# Patient Record
Sex: FEMALE | Race: WHITE | NOT HISPANIC OR LATINO | Employment: FULL TIME | URBAN - METROPOLITAN AREA
[De-identification: names, ages, dates, MRNs, and addresses within clinical notes are randomized per-mention and may not be internally consistent; named-entity substitution may affect disease eponyms.]

---

## 2018-05-20 ENCOUNTER — OFFICE VISIT (OUTPATIENT)
Dept: URGENT CARE | Facility: CLINIC | Age: 63
End: 2018-05-20
Payer: COMMERCIAL

## 2018-05-20 VITALS
WEIGHT: 240 LBS | HEART RATE: 83 BPM | BODY MASS INDEX: 37.67 KG/M2 | HEIGHT: 67 IN | TEMPERATURE: 97 F | OXYGEN SATURATION: 98 % | SYSTOLIC BLOOD PRESSURE: 141 MMHG | DIASTOLIC BLOOD PRESSURE: 85 MMHG | RESPIRATION RATE: 16 BRPM

## 2018-05-20 DIAGNOSIS — S52.122A CLOSED DISPLACED FRACTURE OF HEAD OF LEFT RADIUS, INITIAL ENCOUNTER: Primary | ICD-10-CM

## 2018-05-20 DIAGNOSIS — S80.211A ABRASION OF RIGHT KNEE, INITIAL ENCOUNTER: ICD-10-CM

## 2018-05-20 DIAGNOSIS — S49.92XA INJURY OF LEFT UPPER ARM, INITIAL ENCOUNTER: ICD-10-CM

## 2018-05-20 PROCEDURE — 99204 OFFICE O/P NEW MOD 45 MIN: CPT | Mod: S$GLB,,, | Performed by: NURSE PRACTITIONER

## 2018-05-20 RX ORDER — LOVASTATIN 40 MG/1
40 TABLET ORAL NIGHTLY
COMMUNITY

## 2018-05-20 RX ORDER — MUPIROCIN 20 MG/G
OINTMENT TOPICAL
Qty: 22 G | Refills: 1 | Status: SHIPPED | OUTPATIENT
Start: 2018-05-20

## 2018-05-20 RX ORDER — ACETAMINOPHEN 500 MG
1 TABLET ORAL DAILY
COMMUNITY

## 2018-05-20 RX ORDER — OMEPRAZOLE 20 MG/1
20 CAPSULE, DELAYED RELEASE ORAL DAILY
COMMUNITY

## 2018-05-20 RX ORDER — MELOXICAM 15 MG/1
15 TABLET ORAL DAILY
COMMUNITY

## 2018-05-20 RX ORDER — ACETAMINOPHEN AND CODEINE PHOSPHATE 300; 15 MG/1; MG/1
1 TABLET ORAL
Qty: 15 TABLET | Refills: 0 | Status: SHIPPED | OUTPATIENT
Start: 2018-05-20 | End: 2018-05-21

## 2018-05-20 RX ORDER — AMLODIPINE BESYLATE 5 MG/1
5 TABLET ORAL DAILY
COMMUNITY

## 2018-05-20 RX ORDER — TRIAMTERENE AND HYDROCHLOROTHIAZIDE 37.5; 25 MG/1; MG/1
1 CAPSULE ORAL EVERY MORNING
COMMUNITY

## 2018-05-20 RX ORDER — MUPIROCIN 20 MG/G
OINTMENT TOPICAL
Status: COMPLETED | OUTPATIENT
Start: 2018-05-20 | End: 2018-05-20

## 2018-05-20 RX ORDER — POTASSIUM CHLORIDE 1.5 G/1.58G
20 POWDER, FOR SOLUTION ORAL DAILY
COMMUNITY

## 2018-05-20 RX ADMIN — MUPIROCIN: 20 OINTMENT TOPICAL at 12:05

## 2018-05-20 NOTE — ADDENDUM NOTE
Addended by: THEODORE RICHARDSON on: 5/20/2018 12:56 PM     Modules accepted: Orders, Level of Service

## 2018-05-20 NOTE — PROGRESS NOTES
"Subjective:       Patient ID: Iza Powell is a 62 y.o. female.    Vitals:  height is 5' 7" (1.702 m) and weight is 108.9 kg (240 lb). Her temperature is 97.4 °F (36.3 °C). Her blood pressure is 141/85 (abnormal) and her pulse is 83. Her respiration is 16 and oxygen saturation is 98%.     Chief Complaint: Arm Injury (happened 2 minutes ago)    Pt presents with injury to her left arm from a fall that happened about 20 minutes ago. Pt states she scrapped her left knee as well. Pt denies any loss of consciousness or dizziness. Pt states she can move her arm and she cant hold much.      Arm Injury    The incident occurred less than 1 hour ago. The injury mechanism was a fall. The pain is present in the left forearm. Pertinent negatives include no chest pain or numbness.     Review of Systems   Constitution: Negative for weakness and malaise/fatigue.   HENT: Negative for nosebleeds.    Cardiovascular: Negative for chest pain and syncope.   Respiratory: Negative for shortness of breath.    Musculoskeletal: Positive for falls and joint pain. Negative for back pain and neck pain.   Gastrointestinal: Negative for abdominal pain.   Genitourinary: Negative for hematuria.   Neurological: Negative for dizziness and numbness.       Objective:      Physical Exam   Musculoskeletal:        Left elbow: She exhibits decreased range of motion. She exhibits no swelling, no effusion, no deformity and no laceration. Tenderness found. Lateral epicondyle tenderness noted. No radial head, no medial epicondyle and no olecranon process tenderness noted.        Right knee: Tenderness found. No medial joint line, no lateral joint line, no MCL, no LCL and no patellar tendon tenderness noted.        Arms:       Legs:  Right knee abrasion. Patient able to bear weight without pain or a limp.                   Assessment:       1. Injury of left upper arm, initial encounter    2. Abrasion of right knee, initial encounter    3. Closed displaced " fracture of head of left radius, initial encounter        Plan:         Injury of left upper arm, initial encounter  -     X-Ray Elbow Complete Left; Future; Expected date: 05/20/2018  -     X-Ray Forearm Left; Future; Expected date: 05/20/2018    Abrasion of right knee, initial encounter    Closed displaced fracture of head of left radius, initial encounter  -     IMMOBILIZER FOR HOME USE       Patient Instructions                                                                Ortho   If your condition worsens or fails to improve we recommend that you receive another evaluation at the ER immediately or contact your PCP to discuss your concerns or return here. You must understand that you've received an urgent care treatment only and that you may be released before all your medical problems are known or treated. You the [facility]   If you were prescribed a narcotic or muscle relaxant do not drive or operate heavy machinery while taking these medications   Tylenol or ibuprofen can also be used as directed for pain unless you have an allergy to them or medical condition such as stomach ulcers, kidney or liver disease or blood thinners etc for which you should not be taking these type of medications.   If you were given a prescription NSAID here do not also take any over the counter NSAID like ibuprofen, aleve, advil, motrin etc   RICE which means rest, ice compression and elevation are helpful.   If you have Low Back Pain and develop bowel or bladder symptoms or increase pain going down your legs go to the ER immediately.   If you were given a splint wear it at all times.   If you were given crutches use them as we instructed. Do not rest your armpits on the foam pad or you risk compressing the nerves and the vessels there     Skin Infection  If your condition worsens or fails to improve we recommend that you receive another evaluation at the ER immediately or contact your  PCP to discuss your concerns or return here. You must understand that you've received an urgent care treatment only and that you may be released before all your medical problems are known or treated. You the patient will arrange for [facility] care as instructed.   Cool compressed to affected areas at least 2-3 times a day.  Avoid picking or manipulating the wound. Clean the wound twice a day and put the antibiotic ointment on it.   You should seek ER treatment if fever, increase pain, swelling, red streaks from affected area or other signs of increasing infection.   If you were prescribed antibiotics, please take them to completion  Tylenol or ibuprofen can also be used as directed for pain unless you have an allergy to them or medical condition such as stomach ulcers, kidney or liver disease or blood thinners etc for which you should not be taking these type of medications.   If not allergic, please take over the counter Tylenol (Acetaminophen) and/or Motrin (Ibuprofen) as directed for control of pain and/or fever.   Elbow Fracture    You have a break (fracture) of one or more bones of your elbow joint. This may be a small crack in the bone. Or it may be a major break, with the broken parts pushed out of position.  This fracture usually takes 4 to 12 weeks to heal, depending on the type. The first step in treatment is with a splint or cast. Severe fractures may need surgery to put the bone fragments back into place.  Home care  Follow these guidelines when caring for yourself at home:  · Keep your arm elevated to reduce pain and swelling. When sitting or lying down keep your arm above the level of your heart. You can do this by placing your arm on a pillow that rests on your chest or on a pillow at your side. This is most important during the first 2 days (48 hours) after the injury.  · Put an ice pack on the injured area. Do this for 20 minutes every 1 to 2 hours the first day. You can make an ice pack by wrapping  a plastic bag of ice cubes in a thin towel. As the ice melts, be careful that the cast or splint doesnt get wet. You can place the ice pack inside the sling and directly over the splint or cast. Continue to use the ice pack 3 to 4 times a day for the next 2 days. Then use the ice pack as needed to ease pain and swelling.  · Keep the splint or cast completely dry at all times. Bathe with your splint or cast out of the water. Protect it with a large plastic bag, rubber-banded at the top end. If a fiberglass splint or cast gets wet, you can dry it with a hair dryer.  · You may use acetaminophen or ibuprofen to control pain, unless another pain medicine was prescribed. If you have chronic liver or kidney disease, talk with your healthcare provider before using these medicines. Also talk with your provider if youve had a stomach ulcer or gastrointestinal bleeding.  · Dont put creams or objects under the cast if you have itching.  Follow-up care  Follow up with your healthcare provider in 1 week, or as advised. This is to make sure the bone is healing the way it should. If a splint was put on, it may be changed to a cast during your follow-up visit.  X-rays may be taken. You will be told of any new findings that may affect your care.  When to seek medical advice  Call your healthcare provider right away if any of these occur:  · The cast or splint cracks  · The plaster cast or splint becomes wet or soft  · The fiberglass cast or splint stays wet for more than 24 hours  · Tightness or pain under the cast or splint gets worse  · Bad odor from the cast or wound fluid stains the cast  · Fingers become swollen, cold, blue, numb, or tingly  · You cant move your fingers  · Skin around cast becomes red  · Fever of 100.4ºF (38ºC) or higher, or as directed by your healthcare provider   Date Last Reviewed: 2/6/2017  © 7166-4541 [employer]. [address] [place] [address], [place] [address]. All rights reserved. This  information is not intended as a substitute for professional medical care. Always follow your healthcare professional's instructions.        Elbow Fracture    You have a break or fracture of the elbow. That means you have a crack or break in one or more of the bones of the elbow joint. Fractures usually takes 4-12 weeks to heal, depending on the type. Initial treatment is with a splint or cast. Severe fractures may require surgery to put the bone fragments back into place.  The elbow joint is formed by three arm bones:  · Radius. This is the bone on the thumb side of the forearm.  · Ulna. This is the bone on the little-finger side of the forearm. The ulna forms the tip of the elbow.  · Humerus. This is the upper arm bone that connects to the shoulder.  Home care  · Keep your arm elevated to reduce pain and swelling. When sitting or lying down elevate your arm above the level of your heart. You can do this by placing your arm on a pillow that rests on your chest or on a pillow at your side. This is most important during the first 48 hours after injury.  · Apply an ice pack over the injured area for 15 to 20 minutes every 3 to 6 hours. You should do this for the first 24 to 48 hours. You can make an ice pack by filling a plastic bag that seals at the top with ice cubes and then wrapping it with a thin towel. Never put ice or an ice pack directly on your skin. You can place the ice pack inside the sling and directly over the splint. Continue to use ice packs for relief of pain and swelling as needed. As the ice melts, be careful to avoid getting your wrap, splint, or cast wet. After 48 hours, apply heat (warm shower or warm bath) for 15 to 20 minutes several times a day, or alternate ice and heat.  · If you were given a sling and splint, leave it in place for the time advised. Keep the splint completely dry at all times. Bathe with your splint out of the water protected with 2 large plastic bags, one outside of the  other, each taped with duct tape at the top end. If a fiberglass splint/cast gets wet, you can dry it with a hair dryer on a cool setting.  · If you were given a sling only, wear it for the first week. Unless told otherwise, gradually begin range of motion exercises, after the first week, or as advised by your healthcare provider.  · You may use over-the-counter pain medicine to control pain, unless another pain medicine was prescribed. If you have chronic liver or kidney disease or ever had a stomach ulcer or GI bleeding, talk with your healthcare provider using these medicines.  Follow-up care  Follow up with your healthcare provider, or as advised.  An elbow joint will become stiff if immobile for too long. Ask your healthcare provider when to begin range of motion exercises to prevent the elbow from getting stiff. If X-rays were taken, you will be told if there are any new findings that may affect your care.  When to seek medical advice  Call your healthcare provider right away if any of these occur:  · The plaster splint becomes wet or soft  · The cast becomes loose  · The fiberglass splint remains wet for more than 24 hours  · Increased tightness or pain develops in the elbow  · A foul smell comes from the cast  · Fingers become swollen, cold, blue, numb or tingly  Date Last Reviewed: 12/3/2015  © 8378-0478 [employer]. [address] [place] [address], [place] [address]. All rights reserved. This information is not intended as a substitute for professional medical care. Always follow your healthcare professional's instructions.        Abrasions  Abrasions are skin scrapes. Their treatment depends on how large and deep the abrasion is.  Home care  You may be prescribed an antibiotic cream or ointment to apply to the wound. This helps prevent infection. Follow instructions when using this medicine.  General care  · To care for the abrasion, do the following each day for as long as directed by your  healthcare provider.  ¨ If you were given a bandage, change it once a day. If your bandage sticks to the wound, soak it in warm water until it loosens.  ¨ Wash the area with soap and warm water. You may do this in a sink or under a tub faucet or shower. Rinse off the soap. Then pat the area dry with a clean towel.  ¨ If antibiotic ointment or cream was prescribed, reapply it to the wound as directed. Cover the wound with a fresh nonstick bandage. If the bandage becomes wet or dirty, change it as soon as possible.  ¨ Some antibiotic ointments or cream can cause an allergic reaction or dermatitis. This may cause redness, itching and or hives. If this occurs, stop using the ointment immediately and wash off any remaining ointment. You may need to take some allergy medicine to relieve symptoms.  · You may use acetaminophen or ibuprofen to control pain unless another pain medicine was prescribed. Talk with your healthcare provider before using these medicines if you have chronic liver or kidney disease or ever had a stomach ulcer or GI bleeding. Dont use ibuprofen in children younger than six months old.  · Most skin wounds heal within 10 days. But an infection may occur even with treatment. So its important to watch the wound for signs of infection as listed below.  Follow-up care  Follow up with your healthcare provider, or as advised.  When to seek medical advice  Call your healthcare provider right away if any of these occur:  · Fever of 100.4ºF (38ºC) or higher, or as directed by your healthcare provider  · Increasing pain, redness, swelling, or drainage from the wound  · Bleeding from the wound that does not stop after a few minutes of steady, firm pressure  · Decreased ability to move any body part near the wound  Date Last Reviewed: 3/3/2017  © 3780-3649 [employer]. [address] [place] [address], [place] [address]. All rights reserved. This information is not intended as a substitute for professional  medical care. Always follow your healthcare professional's instructions.        Discharge Instructions: Caring for Your Splint  You will be going home with a splint. This is sometimes called a removable cast. A splint helps your body heal by holding your injured bones or joints in place. Take good care of your splint. A damaged splint can keep your injury from healing well. If your splint becomes damaged or loses its shape, you may need to replace it.   You have a broken ___________________ bone.  This bone is located in your ____________.   Home care  · Wear your splint according to your doctors instructions.  · Keep the splint dry at all times. Bathe with your splint well out of the water. You can hold the splint outside the tub or shower when bathing. Protect it with a large plastic bag closed at the top end with a rubber band. Use two layers of plastic to help keep the splint dry. Or you can buy a waterproof shield.  · If a splint gets wet, dry it with a hair dryer on the cool setting. Dont use the warm or hot setting, because those settings can burn your skin.  · Always keep the splint clean and away from dirt.  · Wash the Velcro straps and inner cloth sleeve (stockinet) with soapy water and air dry.  · Keep your splint away from open flames.  · Dont expose your splint to heat, space heaters, or prolonged sunlight. Excessive heat will cause the splint to change shape.  · Dont cut or tear the splint.   · Exercise all the nearby joints not kept still by the splint. If you have a long leg splint, exercise your hip joint and your toes. If you have an arm splint, exercise your shoulder, elbow, thumb, and fingers.  · Elevate the part of your body that is in the splint. This helps reduce swelling.  Follow-up care  Make a follow-up appointment with your healthcare provider, or as advised.  When to call your healthcare provider  Call your healthcare provider right away if you have any of these:  · Tingling or  "numbness in the affected area  · Severe pain that cannot be relieved with medicine  · Cast that feels too tight or too loose  · Swelling, coldness, or blue-gray color in the fingers or toes  · Cast that is damaged, cracked, or has rough edges that hurt  · Pressure sores or red marks that dont go away within 1 hour after removing the splint  · Blisters   Date Last Reviewed: 7/1/2016  © 5009-0787 [employer]. [address] [place] [address]. All rights reserved. This information is not intended as a substitute for professional medical care. Always follow your healthcare professional's instructions.        Narcotic Warning:  You were prescribed a narcotic medication to be taken for pain over "5 out of 10" on a pain scale of "0-10".  Please be aware as we discussed that narcotics can be addictive. I have given you a limited quantity to take as it is needed at this time. However take it sparingly and only when needed.    No driving, drinking or operating heavy machinery while taking this medication.       "

## 2018-05-20 NOTE — PATIENT INSTRUCTIONS
Ortho   If your condition worsens or fails to improve we recommend that you receive another evaluation at the ER immediately or contact your PCP to discuss your concerns or return here. You must understand that you've received an urgent care treatment only and that you may be released before all your medical problems are known or treated. You the patient will arrange for follouwp care as instructed.   If you were prescribed a narcotic or muscle relaxant do not drive or operate heavy machinery while taking these medications   Tylenol or ibuprofen can also be used as directed for pain unless you have an allergy to them or medical condition such as stomach ulcers, kidney or liver disease or blood thinners etc for which you should not be taking these type of medications.   If you were given a prescription NSAID here do not also take any over the counter NSAID like ibuprofen, aleve, advil, motrin etc   RICE which means rest, ice compression and elevation are helpful.   If you have Low Back Pain and develop bowel or bladder symptoms or increase pain going down your legs go to the ER immediately.   If you were given a splint wear it at all times.   If you were given crutches use them as we instructed. Do not rest your armpits on the foam pad or you risk compressing the nerves and the vessels there     Skin Infection  If your condition worsens or fails to improve we recommend that you receive another evaluation at the ER immediately or contact your PCP to discuss your concerns or return here. You must understand that you've received an urgent care treatment only and that you may be released before all your medical problems are known or treated. You the patient will arrange for follouwp care as instructed.   Cool compressed to affected areas at least 2-3 times a day.  Avoid picking or manipulating the wound. Clean the wound twice a day and put the antibiotic ointment  "on it.   You should seek ER treatment if fever, increase pain, swelling, red streaks from affected area or other signs of increasing infection.   If you were prescribed antibiotics, please take them to completion  Tylenol or ibuprofen can also be used as directed for pain unless you have an allergy to them or medical condition such as stomach ulcers, kidney or liver disease or blood thinners etc for which you should not be taking these type of medications.   If not allergic, please take over the counter Tylenol (Acetaminophen) and/or Motrin (Ibuprofen) as directed for control of pain and/or fever.     Narcotic Warning:  You were prescribed a narcotic medication to be taken for pain over "5 out of 10" on a pain scale of "0-10".  Please be aware as we discussed that narcotics can be addictive. I have given you a limited quantity to take as it is needed at this time. However take it sparingly and only when needed.    No driving, drinking or operating heavy machinery while taking this medication.   Elbow Fracture    You have a break (fracture) of one or more bones of your elbow joint. This may be a small crack in the bone. Or it may be a major break, with the broken parts pushed out of position.  This fracture usually takes 4 to 12 weeks to heal, depending on the type. The first step in treatment is with a splint or cast. Severe fractures may need surgery to put the bone fragments back into place.  Home care  Follow these guidelines when caring for yourself at home:  · Keep your arm elevated to reduce pain and swelling. When sitting or lying down keep your arm above the level of your heart. You can do this by placing your arm on a pillow that rests on your chest or on a pillow at your side. This is most important during the first 2 days (48 hours) after the injury.  · Put an ice pack on the injured area. Do this for 20 minutes every 1 to 2 hours the first day. You can make an ice pack by wrapping a plastic bag of ice " cubes in a thin towel. As the ice melts, be careful that the cast or splint doesnt get wet. You can place the ice pack inside the sling and directly over the splint or cast. Continue to use the ice pack 3 to 4 times a day for the next 2 days. Then use the ice pack as needed to ease pain and swelling.  · Keep the splint or cast completely dry at all times. Bathe with your splint or cast out of the water. Protect it with a large plastic bag, rubber-banded at the top end. If a fiberglass splint or cast gets wet, you can dry it with a hair dryer.  · You may use acetaminophen or ibuprofen to control pain, unless another pain medicine was prescribed. If you have chronic liver or kidney disease, talk with your healthcare provider before using these medicines. Also talk with your provider if youve had a stomach ulcer or gastrointestinal bleeding.  · Dont put creams or objects under the cast if you have itching.  Follow-up care  Follow up with your healthcare provider in 1 week, or as advised. This is to make sure the bone is healing the way it should. If a splint was put on, it may be changed to a cast during your follow-up visit.  X-rays may be taken. You will be told of any new findings that may affect your care.  When to seek medical advice  Call your healthcare provider right away if any of these occur:  · The cast or splint cracks  · The plaster cast or splint becomes wet or soft  · The fiberglass cast or splint stays wet for more than 24 hours  · Tightness or pain under the cast or splint gets worse  · Bad odor from the cast or wound fluid stains the cast  · Fingers become swollen, cold, blue, numb, or tingly  · You cant move your fingers  · Skin around cast becomes red  · Fever of 100.4ºF (38ºC) or higher, or as directed by your healthcare provider   Date Last Reviewed: 2/6/2017 © 2000-2017 [employer]. [address] [place] [address], [place] [address]. All rights reserved. This information is not intended  as a substitute for professional medical care. Always follow your healthcare professional's instructions.        Elbow Fracture    You have a break or fracture of the elbow. That means you have a crack or break in one or more of the bones of the elbow joint. Fractures usually takes 4-12 weeks to heal, depending on the type. Initial treatment is with a splint or cast. Severe fractures may require surgery to put the bone fragments back into place.  The elbow joint is formed by three arm bones:  · Radius. This is the bone on the thumb side of the forearm.  · Ulna. This is the bone on the little-finger side of the forearm. The ulna forms the tip of the elbow.  · Humerus. This is the upper arm bone that connects to the shoulder.  Home care  · Keep your arm elevated to reduce pain and swelling. When sitting or lying down elevate your arm above the level of your heart. You can do this by placing your arm on a pillow that rests on your chest or on a pillow at your side. This is most important during the first 48 hours after injury.  · Apply an ice pack over the injured area for 15 to 20 minutes every 3 to 6 hours. You should do this for the first 24 to 48 hours. You can make an ice pack by filling a plastic bag that seals at the top with ice cubes and then wrapping it with a thin towel. Never put ice or an ice pack directly on your skin. You can place the ice pack inside the sling and directly over the splint. Continue to use ice packs for relief of pain and swelling as needed. As the ice melts, be careful to avoid getting your wrap, splint, or cast wet. After 48 hours, apply heat (warm shower or warm bath) for 15 to 20 minutes several times a day, or alternate ice and heat.  · If you were given a sling and splint, leave it in place for the time advised. Keep the splint completely dry at all times. Bathe with your splint out of the water protected with 2 large plastic bags, one outside of the other, each taped with duct  tape at the top end. If a fiberglass splint/cast gets wet, you can dry it with a hair dryer on a cool setting.  · If you were given a sling only, wear it for the first week. Unless told otherwise, gradually begin range of motion exercises, after the first week, or as advised by your healthcare provider.  · You may use over-the-counter pain medicine to control pain, unless another pain medicine was prescribed. If you have chronic liver or kidney disease or ever had a stomach ulcer or GI bleeding, talk with your healthcare provider using these medicines.  Follow-up care  Follow up with your healthcare provider, or as advised.  An elbow joint will become stiff if immobile for too long. Ask your healthcare provider when to begin range of motion exercises to prevent the elbow from getting stiff. If X-rays were taken, you will be told if there are any new findings that may affect your care.  When to seek medical advice  Call your healthcare provider right away if any of these occur:  · The plaster splint becomes wet or soft  · The cast becomes loose  · The fiberglass splint remains wet for more than 24 hours  · Increased tightness or pain develops in the elbow  · A foul smell comes from the cast  · Fingers become swollen, cold, blue, numb or tingly  Date Last Reviewed: 12/3/2015  © 3203-9212 [employer]. [address] [place] [address], [place] [address]. All rights reserved. This information is not intended as a substitute for professional medical care. Always follow your healthcare professional's instructions.        Abrasions  Abrasions are skin scrapes. Their treatment depends on how large and deep the abrasion is.  Home care  You may be prescribed an antibiotic cream or ointment to apply to the wound. This helps prevent infection. Follow instructions when using this medicine.  General care  · To care for the abrasion, do the following each day for as long as directed by your healthcare provider.  ¨ If you were  given a bandage, change it once a day. If your bandage sticks to the wound, soak it in warm water until it loosens.  ¨ Wash the area with soap and warm water. You may do this in a sink or under a tub faucet or shower. Rinse off the soap. Then pat the area dry with a clean towel.  ¨ If antibiotic ointment or cream was prescribed, reapply it to the wound as directed. Cover the wound with a fresh nonstick bandage. If the bandage becomes wet or dirty, change it as soon as possible.  ¨ Some antibiotic ointments or cream can cause an allergic reaction or dermatitis. This may cause redness, itching and or hives. If this occurs, stop using the ointment immediately and wash off any remaining ointment. You may need to take some allergy medicine to relieve symptoms.  · You may use acetaminophen or ibuprofen to control pain unless another pain medicine was prescribed. Talk with your healthcare provider before using these medicines if you have chronic liver or kidney disease or ever had a stomach ulcer or GI bleeding. Dont use ibuprofen in children younger than six months old.  · Most skin wounds heal within 10 days. But an infection may occur even with treatment. So its important to watch the wound for signs of infection as listed below.  Follow-up care  Follow up with your healthcare provider, or as advised.  When to seek medical advice  Call your healthcare provider right away if any of these occur:  · Fever of 100.4ºF (38ºC) or higher, or as directed by your healthcare provider  · Increasing pain, redness, swelling, or drainage from the wound  · Bleeding from the wound that does not stop after a few minutes of steady, firm pressure  · Decreased ability to move any body part near the wound  Date Last Reviewed: 3/3/2017  © 1286-2673 [employer]. [address] [place] [address], [place] [address]. All rights reserved. This information is not intended as a substitute for professional medical care. Always follow your  healthcare professional's instructions.        Discharge Instructions: Caring for Your Splint  You will be going home with a splint. This is sometimes called a removable cast. A splint helps your body heal by holding your injured bones or joints in place. Take good care of your splint. A damaged splint can keep your injury from healing well. If your splint becomes damaged or loses its shape, you may need to replace it.   You have a broken ___________________ bone.  This bone is located in your ____________.   Home care  · Wear your splint according to your doctors instructions.  · Keep the splint dry at all times. Bathe with your splint well out of the water. You can hold the splint outside the tub or shower when bathing. Protect it with a large plastic bag closed at the top end with a rubber band. Use two layers of plastic to help keep the splint dry. Or you can buy a waterproof shield.  · If a splint gets wet, dry it with a hair dryer on the cool setting. Dont use the warm or hot setting, because those settings can burn your skin.  · Always keep the splint clean and away from dirt.  · Wash the Velcro straps and inner cloth sleeve (stockinet) with soapy water and air dry.  · Keep your splint away from open flames.  · Dont expose your splint to heat, space heaters, or prolonged sunlight. Excessive heat will cause the splint to change shape.  · Dont cut or tear the splint.   · Exercise all the nearby joints not kept still by the splint. If you have a long leg splint, exercise your hip joint and your toes. If you have an arm splint, exercise your shoulder, elbow, thumb, and fingers.  · Elevate the part of your body that is in the splint. This helps reduce swelling.  Follow-up care  Make a follow-up appointment with your healthcare provider, or as advised.  When to call your healthcare provider  Call your healthcare provider right away if you have any of these:  · Tingling or numbness in the affected  area  · Severe pain that cannot be relieved with medicine  · Cast that feels too tight or too loose  · Swelling, coldness, or blue-gray color in the fingers or toes  · Cast that is damaged, cracked, or has rough edges that hurt  · Pressure sores or red marks that dont go away within 1 hour after removing the splint  · Blisters   Date Last Reviewed: 7/1/2016  © 1753-7401 [employer]. [address] [place] [address], [place] [address]. All rights reserved. This information is not intended as a substitute for professional medical care. Always follow your healthcare professional's instructions.

## 2018-05-20 NOTE — PROGRESS NOTES
"Patient reports her pain is 2/10 upon arrival but can get up to a 6/10.  Discussed taking Extra Strength Tylenol as directed for pain.  States "I am not a pain med kind of person".  Patient offered a sling which she agreed.  Also offered a referral to a local Orthopedist.  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   Reviewed discharge instructions with patient; she verbalized understanding and agrees with plan of care.      Just before leaving the exam room, patient request pain medicine.  Discussed the rationale for pain med use with her current condition.  [place]  reviewed and no inconsistencies found.  Tylenol #2 prescribed; this medication is intended to be used for pain over 5/10 on a pain scale of 0-10.   Patient verbalized understanding and agreed with plan of care.       "

## 2018-05-21 ENCOUNTER — TELEPHONE (OUTPATIENT)
Dept: ORTHOPEDICS | Facility: CLINIC | Age: 63
End: 2018-05-21

## 2018-05-21 ENCOUNTER — OFFICE VISIT (OUTPATIENT)
Dept: ORTHOPEDICS | Facility: CLINIC | Age: 63
End: 2018-05-21
Payer: COMMERCIAL

## 2018-05-21 VITALS
DIASTOLIC BLOOD PRESSURE: 90 MMHG | WEIGHT: 240 LBS | BODY MASS INDEX: 37.67 KG/M2 | SYSTOLIC BLOOD PRESSURE: 147 MMHG | HEIGHT: 67 IN | HEART RATE: 79 BPM

## 2018-05-21 DIAGNOSIS — S52.125A CLOSED NONDISPLACED FRACTURE OF HEAD OF LEFT RADIUS, INITIAL ENCOUNTER: Primary | ICD-10-CM

## 2018-05-21 PROCEDURE — 99999 PR PBB SHADOW E&M-EST. PATIENT-LVL III: CPT | Mod: PBBFAC,,, | Performed by: PHYSICIAN ASSISTANT

## 2018-05-21 PROCEDURE — 99204 OFFICE O/P NEW MOD 45 MIN: CPT | Mod: 25,S$GLB,, | Performed by: PHYSICIAN ASSISTANT

## 2018-05-21 PROCEDURE — 3008F BODY MASS INDEX DOCD: CPT | Mod: CPTII,S$GLB,, | Performed by: PHYSICIAN ASSISTANT

## 2018-05-21 PROCEDURE — 29105 APPLICATION LONG ARM SPLINT: CPT | Mod: LT,S$GLB,, | Performed by: PHYSICIAN ASSISTANT

## 2018-05-21 NOTE — TELEPHONE ENCOUNTER
Ortho Telephone Triage Call  [phone]  Patient C/O:L elbow injury s/p fall 5/20/18 - while in town for meeting -  and seen at [facility]. Sling placed. Pt requests Ortho follow up appt, as she will be returning to [place] on 5/24/18 . States, presently, in a meeting and asks that VM be left with appt information.   HX: Closed displaced fracture of head of L radius 5/20/18  Triage Advice:Maintain sling. Advised pt that she will receive a follow up call.   Resolution: Pt states understanding.

## 2018-05-21 NOTE — PROGRESS NOTES
Subjective:      Patient ID: Iza Powell is a 62 y.o. female.    Chief Complaint: Pain of the Left Hand      HPI  Iza Powell is a 62 y.o. female presenting today for left radial head fracture. Injury occurred 5/02/18. Pt had a fall onto the left elbow. She also mentions abrasion to the right knee. Pt was seen in urgent care yesterday where xrays revealed left radial head fracture. She was placed into a sling which she has been wearing nearly full time. Pt has intermittent pain, ranges between 2-4/10. She is taking OTC Tylenol prn with good pain control. Denies numbness or tingling. Pt is here traveling, she is from [place] and will be returning home in a few days. She has orthopedic follow up scheduled when she returns.      Review of patient's allergies indicates:  No Known Allergies      Current Outpatient Prescriptions   Medication Sig Dispense Refill    amLODIPine (NORVASC) 5 MG tablet Take 5 mg by mouth once daily.      cholecalciferol, vitamin D3, (VITAMIN D3) 2,000 unit Cap Take 1 capsule by mouth once daily.      lovastatin (MEVACOR) 40 MG tablet Take 40 mg by mouth every evening.      meloxicam (MOBIC) 15 MG tablet Take 15 mg by mouth once daily.      mupirocin (BACTROBAN) 2 % ointment Apply to affected area 3 times daily 22 g 1    omeprazole (PRILOSEC) 20 MG capsule Take 20 mg by mouth once daily.      potassium chloride (KLOR-CON) 20 mEq Pack Take 20 mEq by mouth once daily.      triamterene-hydrochlorothiazide 37.5-25 mg (DYAZIDE) 37.5-25 mg per capsule Take 1 capsule by mouth every morning.       No current facility-administered medications for this visit.        Past Medical History:   Diagnosis Date    Arthritis     GERD (gastroesophageal reflux disease)     Hyperlipidemia     Hypertension     Vitamin D deficiency        Past Surgical History:   Procedure Laterality Date    ANKLE SURGERY         Review of Systems:  Constitutional: Negative for chills and fever.  "  Respiratory: Negative for cough and shortness of breath.    Gastrointestinal: Negative for nausea and vomiting.   Skin: Negative for rash.   Neurological: Negative for dizziness and headaches.   Psychiatric/Behavioral: Negative for depression.   MSK as in HPI       OBJECTIVE:     PHYSICAL EXAM:  BP (!) 147/90 (BP Location: Right arm, Patient Position: Sitting, BP Method: Small (Automatic))   Pulse 79   Ht 5' 7" (1.702 m)   Wt 108.9 kg (240 lb)   BMI 37.59 kg/m²     GEN:  NAD, well-developed, well-groomed.  NEURO: Awake, alert, and oriented. Normal attention and concentration.    PSYCH: Normal mood and affect. Behavior is normal.  HEENT: No cervical lymphadenopathy noted.  CARDIOVASCULAR: Radial pulses 2+ bilaterally. No LE edema noted.  PULMONARY: Breath sounds normal. No respiratory distress.  SKIN: Intact, no rashes.      MSK:   LUE:  Good active ROM of the wrist and fingers. Slightly decreased elbow motion. There is mild/mod edema of the left elbow. AIN/PIN/Radial/Median/Ulnar Nerves assessed in isolation without deficit. Radial & Ulnar arteries palpated 2+. Capillary Refill <3s.      RADIOGRAPHS:  Xray left elbow 5/20/18  FINDINGS:  Three views.    There is abnormal displacement of the anterior and posterior fat pads.  Anterior humeral line and radiocapitellar line are in appropriate orientation.  There is an acute partially impacted fracture of the left radial head.  No dislocation.  Degenerative changes are noted of the elbow.    Comments: I have personally reviewed the imaging and I agree with the above radiologist's report.    ASSESSMENT/PLAN:       ICD-10-CM ICD-9-CM   1. Closed nondisplaced fracture of head of left radius, initial encounter S52.125A 813.05        Plan:   -Pt placed in long arm posterior orthoglass today. She has a well fitting sling.   -Pt is scheduled to see orthopedist in her hometown next Tuesday. She has copies of her xrays.   -RTC prn, call with questions         The patient " indicates understanding of these issues and agrees to the plan.    Rin Eaton PA-C  [facility] [place], [place]

## 2018-05-21 NOTE — TELEPHONE ENCOUNTER
Ortho Telephone Triage Follow Up Call [phone]  LVM notifying pt that appt has been scheduled today with JAYESH Eaton PA-C/[facility] at 2:45pm with arrival at 2:30pm and  requesting that pt return call to confirm appt.

## 2018-05-21 NOTE — TELEPHONE ENCOUNTER
.Ortho Telephone Triage Follow Up Call [phone]  Pt left VM message confirming Ortho appt today with JAYESH Eaton PA-C/[facility] at 2:45pm.